# Patient Record
Sex: MALE | Race: WHITE | NOT HISPANIC OR LATINO | Employment: FULL TIME | URBAN - METROPOLITAN AREA
[De-identification: names, ages, dates, MRNs, and addresses within clinical notes are randomized per-mention and may not be internally consistent; named-entity substitution may affect disease eponyms.]

---

## 2018-04-02 ENCOUNTER — HOSPITAL ENCOUNTER (EMERGENCY)
Facility: CLINIC | Age: 23
Discharge: HOME | End: 2018-04-02
Attending: EMERGENCY MEDICINE | Admitting: EMERGENCY MEDICINE
Payer: OTHER GOVERNMENT

## 2018-04-02 VITALS
SYSTOLIC BLOOD PRESSURE: 140 MMHG | RESPIRATION RATE: 16 BRPM | WEIGHT: 250 LBS | DIASTOLIC BLOOD PRESSURE: 98 MMHG | TEMPERATURE: 97.9 F | BODY MASS INDEX: 33.67 KG/M2 | OXYGEN SATURATION: 97 %

## 2018-04-02 DIAGNOSIS — L03.116 CELLULITIS OF LEFT LEG: ICD-10-CM

## 2018-04-02 PROCEDURE — 76882 US LMTD JT/FCL EVL NVASC XTR: CPT | Mod: 26 | Performed by: EMERGENCY MEDICINE

## 2018-04-02 PROCEDURE — 99284 EMERGENCY DEPT VISIT MOD MDM: CPT | Mod: 25 | Performed by: EMERGENCY MEDICINE

## 2018-04-02 PROCEDURE — 25000132 ZZH RX MED GY IP 250 OP 250 PS 637: Performed by: EMERGENCY MEDICINE

## 2018-04-02 PROCEDURE — 76882 US LMTD JT/FCL EVL NVASC XTR: CPT | Performed by: EMERGENCY MEDICINE

## 2018-04-02 RX ORDER — SULFAMETHOXAZOLE/TRIMETHOPRIM 800-160 MG
1 TABLET ORAL ONCE
Status: COMPLETED | OUTPATIENT
Start: 2018-04-02 | End: 2018-04-02

## 2018-04-02 RX ORDER — CEPHALEXIN 500 MG/1
500 CAPSULE ORAL ONCE
Status: COMPLETED | OUTPATIENT
Start: 2018-04-02 | End: 2018-04-02

## 2018-04-02 RX ORDER — FLUTICASONE PROPIONATE 50 MCG
2 SPRAY, SUSPENSION (ML) NASAL
COMMUNITY
Start: 2018-03-29

## 2018-04-02 RX ORDER — IBUPROFEN 200 MG
800 TABLET ORAL EVERY 8 HOURS PRN
Qty: 30 TABLET | Refills: 0 | COMMUNITY
Start: 2018-04-02

## 2018-04-02 RX ORDER — GLYCOPYRROLATE 1 MG/1
1 TABLET ORAL
COMMUNITY
Start: 2018-03-29

## 2018-04-02 RX ORDER — CEPHALEXIN 500 MG/1
500 CAPSULE ORAL 4 TIMES DAILY
Qty: 40 CAPSULE | Refills: 0 | Status: SHIPPED | OUTPATIENT
Start: 2018-04-02 | End: 2018-04-12

## 2018-04-02 RX ORDER — IBUPROFEN 400 MG/1
800 TABLET, FILM COATED ORAL ONCE
Status: COMPLETED | OUTPATIENT
Start: 2018-04-02 | End: 2018-04-02

## 2018-04-02 RX ORDER — SULFAMETHOXAZOLE/TRIMETHOPRIM 800-160 MG
1 TABLET ORAL 2 TIMES DAILY
Qty: 20 TABLET | Refills: 0 | Status: SHIPPED | OUTPATIENT
Start: 2018-04-02 | End: 2018-04-12

## 2018-04-02 RX ORDER — ACETAMINOPHEN 500 MG
1000 TABLET ORAL EVERY 8 HOURS PRN
Qty: 30 TABLET | Refills: 0 | COMMUNITY
Start: 2018-04-02

## 2018-04-02 RX ADMIN — CEPHALEXIN 500 MG: 500 CAPSULE ORAL at 04:50

## 2018-04-02 RX ADMIN — SULFAMETHOXAZOLE AND TRIMETHOPRIM 1 TABLET: 800; 160 TABLET ORAL at 04:50

## 2018-04-02 RX ADMIN — IBUPROFEN 800 MG: 400 TABLET ORAL at 04:49

## 2018-04-02 ASSESSMENT — ENCOUNTER SYMPTOMS
ABDOMINAL PAIN: 0
CHEST TIGHTNESS: 0
CHILLS: 0
APPETITE CHANGE: 0
FEVER: 0
LIGHT-HEADEDNESS: 0
HEADACHES: 0
FATIGUE: 0
SHORTNESS OF BREATH: 0

## 2018-04-02 NOTE — ED AVS SNAPSHOT
[facility] Emergency Department    [address] [facility] [address]    Phone:  [phone]    Fax:  [phone]                                       Charles Perales   MRN: [id number]    Department:  [facility] Emergency Department   Date of Visit:  4/2/2018           Patient Information     Date Of Birth          1995        Your diagnoses for this visit were:     Cellulitis of left leg        You were seen by Oh Polo MD.      Follow-up Information     Follow up with [facility]. Schedule an appointment as soon as possible for a visit in 5 days.    Specialty:  Family Practice    Why:  For follow up of your leg cellulitis    Contact information:    [address] [place] [id number] [phone]    Additional information:    The medical center is located at   [address] [place] [address] (between [facility] and   [address], [place] miles north   of [place]).        Go to [facility] Emergency Department.    Specialty:  EMERGENCY MEDICINE    Why:  As needed, If symptoms worsen    Contact information:    [facility] [id number] [phone]    Additional information:    The medical center is located at   [address] [place] [address] (between [facility] and   [address], [place] north   of [place]).        Discharge Instructions       Alternate acetaminophen with ibuprofen every 4 hours as needed for pain (example: acetaminophen at 8am, ibuprofen at 12pm, acetaminophen at 4pm, ibuprofen at 8pm, etc).  See discharge papers or medication label for dose    Discharge Instructions for Cellulitis  You have been diagnosed with cellulitis. This is an infection in the deepest layer of the skin. In some cases, the infection also affects the muscle. Cellulitis is caused by bacteria. The bacteria can enter the body through broken skin. This can happen with a cut, scratch, animal bite, or an insect bite that has been scratched.  You may have been treated in the hospital with antibiotics and fluids. You will likely be given a prescription for antibiotics to take at home. This sheet will help you take care of yourself at home.  Home care  When you are home:    Take the prescribed antibiotic medicine you are given as directed until it is gone. Take it even if you feel better. It treats the infection and stops it from returning. Not taking all the medicine can make future infections hard to treat.    Keep the infected area clean.    When possible, raise the infected area above the level of your heart. This helps keep swelling down.    Talk with your healthcare provider if you are in pain. Ask what kind of over-the-counter medicine you can take for pain.    Apply clean bandages as advised.    Take your temperature once a day for a week.    Wash your hands often to prevent spreading the infection.  In the future, wash your hands before and after you touch cuts, scratches, or bandages. This will help prevent infection.   When to call your healthcare provider  Call your healthcare provider immediately if you have any of the following:    Difficulty or pain when moving the joints above or below the infected area    Discharge or pus draining from the area    Fever of 100.4 F (38 C) or higher, or as directed by your healthcare provider    Pain that gets worse in or around the infected     Redness that gets worse in or around the infected area, particularly if the area of redness expands to a wider area    Shaking chills    Swelling of the infected area    Vomiting   Date Last Reviewed: 8/1/2016 2000-2017 The [employer]. [address] [place] [address]. All rights reserved. This information is not intended as a substitute for professional medical care. Always follow your healthcare professional's instructions.          24 Hour Appointment Hotline       To make an appointment at any [facility], call [phone]  [phone] If you don't have a family doctor or clinic, we will help you find one. [facility] clinics are conveniently located to serve the needs of you and your family.             Review of your medicines      START taking        Dose / Directions Last dose taken    acetaminophen 500 MG tablet   Commonly known as:  TYLENOL   Dose:  1000 mg   Quantity:  30 tablet        Take 2 tablets (1,000 mg) by mouth every 8 hours as needed for mild pain   Refills:  0        cephALEXin 500 MG capsule   Commonly known as:  KEFLEX   Dose:  500 mg   Quantity:  40 capsule        Take 1 capsule (500 mg) by mouth 4 times daily for 10 days   Refills:  0        ibuprofen 200 MG tablet   Commonly known as:  ADVIL/MOTRIN   Dose:  800 mg   Quantity:  30 tablet        Take 4 tablets (800 mg) by mouth every 8 hours as needed for mild pain   Refills:  0        sulfamethoxazole-trimethoprim 800-160 MG per tablet   Commonly known as:  BACTRIM DS   Dose:  1 tablet   Quantity:  20 tablet        Take 1 tablet by mouth 2 times daily for 10 days   Refills:  0          Our records show that you are taking the medicines listed below. If these are incorrect, please call your family doctor or clinic.        Dose / Directions Last dose taken    aluminum chloride 20 % external solution   Commonly known as:  DRYSOL   Quantity:  60 mL        Apply topically At Bedtime To improve effect, cover area of application with plastic wrap,  hold in place with golves, and wash area in morning. As sweating improves, decrease use to 1-2 times weekly.   Refills:  0        BD insulin syringe ULTRAFINE 30G X 1/2\" 0.5 ML   Dose:  1 Syringe   Quantity:  100 each   Generic drug:  insulin syringe-needle U-100        1 Syringe 4 times daily.   Refills:  prn        blood glucose monitoring lancets   Quantity:  100 each        by Lancet route. 2   Refills:  12        Cholecalciferol 97245 UNITS Tabs        Refills:  0        fluticasone 50 MCG/ACT spray   Commonly known " as:  FLONASE   Dose:  2 spray        2 sprays   Refills:  0        * FREESTYLE LITE test strip   Quantity:  100 strip   Generic drug:  blood glucose monitoring        by Strip route 4 times daily (before meals and nightly). 4   Refills:  prn        * FREESTYLE LITE test strip   Quantity:  100 strip   Generic drug:  blood glucose monitoring        Use to test blood sugars 6 daily or as directed.   Refills:  12        GLUCAGON EMERGENCY 1 MG kit   Dose:  1 mg   Generic drug:  glucagon        Inject 1 mg Subcutaneous   Refills:  0        glycopyrrolate 1 MG tablet   Commonly known as:  ROBINUL   Dose:  1 mg        Take 1 mg by mouth   Refills:  0        insulin aspart 100 UNIT/ML injection   Commonly known as:  NovoLOG PEN   Quantity:  3 Month        Sliding scale before meals  150-200 2 units  201-250 4 units  251-300 6 units  301-350 8 units  351-400 10 units  >400 12 units   ( Patient uses up to 150 units per day)   Refills:  0        insulin glargine 100 UNIT/ML injection   Commonly known as:  LANTUS SOLOSTAR   Dose:  24 Units   Quantity:  3 Month        Inject 24 Units Subcutaneous At Bedtime   Refills:  12        ramipril 1.25 MG capsule   Commonly known as:  ALTACE   Dose:  1.25 mg   Quantity:  90 capsule        Take 1 capsule (1.25 mg) by mouth daily   Refills:  0        * Notice:  This list has 2 medication(s) that are the same as other medications prescribed for you. Read the directions carefully, and ask your doctor or other care provider to review them with you.            Prescriptions were sent or printed at these locations (4 Prescriptions)                   [facility] [address] [address]    Telephone:  [phone]   Fax:  [phone]   Hours:                  E-Prescribed (2 of 4)         cephALEXin (KEFLEX) 500 MG capsule               sulfamethoxazole-trimethoprim (BACTRIM DS) 800-160 MG per tablet                 Not Printed or  Sent (2 of 4)         ibuprofen (ADVIL/MOTRIN) 200 MG tablet               acetaminophen (TYLENOL) 500 MG tablet                Procedures and tests performed during your visit     POC US SOFT TISSUE      Orders Needing Specimen Collection     None      Pending Results     No orders found from 3/31/2018 to 4/3/2018.            Pending Culture Results     No orders found from 3/31/2018 to 4/3/2018.            Pending Results Instructions     If you had any lab results that were not finalized at the time of your Discharge, you can call the ED Lab Result RN at [phone]. You will be contacted by this team for any positive Lab results or changes in treatment. The nurses are available 7 days a week from 10A to 6:30P.  You can leave a message 24 hours per day and they will return your call.        Test Results From Your Hospital Stay        4/2/2018  4:48 AM      [facility] Procedure Note     Limited Bedside ED Ultrasound of Soft Tissue:    PROCEDURE: PERFORMED BY: Dr. Oh Mishra Ogema  INDICATIONS/SYMPTOM: Skin redness, evaluate for abscess, cellulitis or foreign body  PROBE: High frequency linear probe  BODY LOCATION: Soft tissue located on extremity     FINDINGS: Cobblestoning of soft tissue: present   Hypoechoic fluid (ie abscess) identified: a small linear hypoechoic channel present approximately 0.3 cm x 2 cm      INTERPRETATION:  The soft tissue and muscle layers were evaluated.      Findings indicate cellulitis with possible small linear abscess.    IMAGE DOCUMENTATION: Images were archived to PACs system.                    Thank you for choosing [facility]       Thank you for choosing [facility] for your care. Our goal is always to provide you with excellent care. Hearing back from our patients is one way we can continue to improve our services. Please take a few minutes to complete the written survey that you may receive in the mail after you visit with us. Thank you!        [facility]  Information     [employer] gives you secure access to your electronic health record. If you see a primary care provider, you can also send messages to your care team and make appointments. If you have questions, please call your primary care clinic.  If you do not have a primary care provider, please call [phone] and they will assist you.        Care EveryWhere ID     This is your Care EveryWhere ID. This could be used by other organizations to access your [facility] medical records  [id number]        Equal Access to Services     BENOIT SEWELL : Hadii fabrizio jeano Somedardo, waaxda luqadaha, qaybta kaalmada adekristy, ben antunez. So [facility] [phone].    ATENCIÓN: Si habla español, tiene a rock disposición servicios gratuitos de asistencia lingüística. Llame al [phone].    We comply with applicable federal civil rights laws and [place] laws. We do not discriminate on the basis of race, color, national origin, age, disability, sex, sexual orientation, or gender identity.            After Visit Summary       This is your record. Keep this with you and show to your community pharmacist(s) and doctor(s) at your next visit.

## 2018-04-02 NOTE — ED PROVIDER NOTES
"  History     Chief Complaint   Patient presents with     Wound Check     \"cyst\" inner left thigh X 3-4 days     HPI  Charles Perales is a 22 year old male with history of frequent skin infections presents for evaluation of progressive worsening soreness in his left inner thigh over the past 3-4 days.  Patient reports it began to drain some yesterday with a fair amount of purulent material and slight bleeding.  Tonight continues to be sore draining has decreased.  Denies fever or chills.  Has not taken anything for pain since yesterday morning.    Problem List:    Patient Active Problem List    Diagnosis Date Noted     Ketonuria 04/13/2012     Priority: High     Hyperlipidemia LDL goal <100 02/17/2013     Priority: Medium     Intermittent asthma 04/20/2012     Priority: Medium     Health Care Home 04/17/2012     Priority: Medium     EMERGENCY CARE PLAN  August 6, 2013: No current Care Coordination follow up planned. Please refer if Care Coordination services are needed.    Presenting Problem Signs and Symptoms Treatment Plan   Questions or concerns   during clinic hours   I will call my clinic directly:  [address] [address]  [phone].   Questions or concerns outside clinic hours   I will call the 24 hour nurse line at   [phone] or [facility].   Need to schedule an appointment   I will call the 24 hour scheduling team at [phone] or my clinic directly at [phone].    Same day treatment     I will call my clinic first, nurse line if after hours, urgent care and express care if needed.   Clinic care coordination services (regular clinic hours)     I will call a clinic care coordinator directly:     Nixon Kramer RN  Mon, Tues, Fri - [phone]  Wed, Thurs - [phone]    Dian Diamond :    [phone]    Or call my clinic at [phone] and ask to speak with care coordination.   Crisis Services: Behavioral or Mental Health  Crisis Connection 24 " "Hour Phone Line  [phone]    [facility] 24 Hour Crisis Services  [phone]    [facility]al [facility] Providers[facility] [phone]    [facility]   [phone]       Emergency treatment -- Immediately    [facility] 911            CARDIOVASCULAR SCREENING; LDL GOAL LESS THAN 160 2012     Priority: Medium     Dehydration 2012     Priority: Medium     Type 1 diabetes mellitus with hyperglycemia (H) 2012     Priority: Low        Past Medical History:    Past Medical History:   Diagnosis Date     Asthma      Diabetes mellitus        Past Surgical History:    Past Surgical History:   Procedure Laterality Date     ADENOIDECTOMY  as child     PE TUBES  as child       Family History:    Family History   Problem Relation Age of Onset     Thyroid Disease Mother      hypo     CANCER Father 30      brain cancer       Social History:  Marital Status:  Single [1]  Social History   Substance Use Topics     Smoking status: Former Smoker     Quit date: 2012     Smokeless tobacco: Never Used     Alcohol use No        Medications:      Cholecalciferol 51389 UNITS TABS   fluticasone (FLONASE) 50 MCG/ACT spray   glucagon (GLUCAGON EMERGENCY) 1 MG kit   glycopyrrolate (ROBINUL) 1 MG tablet   cephALEXin (KEFLEX) 500 MG capsule   sulfamethoxazole-trimethoprim (BACTRIM DS) 800-160 MG per tablet   ibuprofen (ADVIL/MOTRIN) 200 MG tablet   acetaminophen (TYLENOL) 500 MG tablet   insulin aspart (NOVOLOG PEN) 100 UNIT/ML soln   insulin glargine (LANTUS SOLOSTAR) 100 UNIT/ML soln   ramipril (ALTACE) 1.25 MG capsule   aluminum chloride (DRYSOL) 20 % external solution   Glucose Blood (FREESTYLE LITE) strip   Insulin Syringe-Needle U-100 (BD INSULIN SYRINGE ULTRAFINE) 30G X 1/2\" 0.5 ML MISC   Glucose Blood (FREESTYLE LITE) strip   FREESTYLE LANCETS MISC         Review of Systems   Constitutional: Negative for appetite change, chills, fatigue and fever.   HENT: Negative for congestion.  "   Respiratory: Negative for chest tightness and shortness of breath.    Gastrointestinal: Negative for abdominal pain.   Skin:        Lesion on left inner thigh   Neurological: Negative for light-headedness and headaches.   All other systems reviewed and are negative.      Physical Exam   BP: (!) 140/98  Heart Rate: 115  Temp: 97.9  F (36.6  C)  Resp: 16  Weight: 113.4 kg (250 lb)  SpO2: 98 %      Physical Exam   Constitutional: He is oriented to person, place, and time. He appears well-developed and well-nourished. No distress.   HENT:   Head: Normocephalic and atraumatic.   Eyes: Conjunctivae are normal.   Cardiovascular:   Initially with borderline tachycardia, improved after exam and treatment   Pulmonary/Chest: Effort normal.   Abdominal: Soft. There is no tenderness.   Musculoskeletal: Normal range of motion.        Left upper leg: He exhibits tenderness.        Legs:  Neurological: He is alert and oriented to person, place, and time.   Skin: Skin is warm and dry.   Psychiatric: He has a normal mood and affect.   Nursing note and vitals reviewed.      ED Course     ED Course     Procedures           Results for orders placed or performed during the hospital encounter of 04/02/18 (from the past 24 hour(s))   POC US SOFT TISSUE    Impression    [facility] Procedure Note     Limited Bedside ED Ultrasound of Soft Tissue:    PROCEDURE: PERFORMED BY: Dr. Oh Polo  INDICATIONS/SYMPTOM: Skin redness, evaluate for abscess, cellulitis or foreign body  PROBE: High frequency linear probe  BODY LOCATION: Soft tissue located on extremity     FINDINGS: Cobblestoning of soft tissue: present   Hypoechoic fluid (ie abscess) identified: a small linear hypoechoic channel present approximately 0.3 cm x 2 cm      INTERPRETATION:  The soft tissue and muscle layers were evaluated.      Findings indicate cellulitis with possible small linear abscess.    IMAGE DOCUMENTATION: Images were archived to PACs system.            Medications   ibuprofen (ADVIL/MOTRIN) tablet 800 mg (800 mg Oral Given 4/2/18 0883)   cephALEXin (KEFLEX) capsule 500 mg (500 mg Oral Given 4/2/18 1520)   sulfamethoxazole-trimethoprim (BACTRIM DS/SEPTRA DS) 800-160 MG per tablet 1 tablet (1 tablet Oral Given 4/2/18 7270)       Assessments & Plan (with Medical Decision Making)  22-year-old male with history of previous skin lesions presenting for evaluation of soreness in his left inner thigh.  Symptoms progressive over the last several days.  No fevers or chills.  Physical exam shows an indurated tender lesion with some active draining.  Ultrasound at bedside showed some thin pocket of residual fluid collection but no discrete fluid pocket amenable to easy drainage.  Discussed risk and benefits of draining the residual small amount of fluid and patient declined.  I think this is reasonable given the small amount of residual fluid.  Started on Bactrim and Keflex for cellulitis with a recommendation to follow-up with primary care in a few days for wound recheck or to return to the ED if symptoms worsen.     I have reviewed the nursing notes.    I have reviewed the findings, diagnosis, plan and need for follow up with the patient.       New Prescriptions    ACETAMINOPHEN (TYLENOL) 500 MG TABLET    Take 2 tablets (1,000 mg) by mouth every 8 hours as needed for mild pain    CEPHALEXIN (KEFLEX) 500 MG CAPSULE    Take 1 capsule (500 mg) by mouth 4 times daily for 10 days    IBUPROFEN (ADVIL/MOTRIN) 200 MG TABLET    Take 4 tablets (800 mg) by mouth every 8 hours as needed for mild pain    SULFAMETHOXAZOLE-TRIMETHOPRIM (BACTRIM DS) 800-160 MG PER TABLET    Take 1 tablet by mouth 2 times daily for 10 days       Final diagnoses:   Cellulitis of left leg       4/2/2018   [facility] EMERGENCY DEPARTMENT     Polo, Oh Mishra MD  04/02/18 2743

## 2018-04-02 NOTE — ED AVS SNAPSHOT
[facility] Emergency Department    [address] [facility] [address]    Phone:  [phone]    Fax:  [phone]                                       Charles Perales   MRN: [id number]    Department:  [facility] Emergency Department   Date of Visit:  4/2/2018           After Visit Summary Signature Page     I have received my discharge instructions, and my questions have been answered. I have discussed any challenges I see with this plan with the nurse or doctor.    ..........................................................................................................................................  Patient/Patient Representative Signature      ..........................................................................................................................................  Patient Representative Print Name and Relationship to Patient    ..................................................               ................................................  Date                                            Time    ..........................................................................................................................................  Reviewed by Signature/Title    ...................................................              ..............................................  Date                                                            Time

## 2018-04-02 NOTE — DISCHARGE INSTRUCTIONS
Alternate acetaminophen with ibuprofen every 4 hours as needed for pain (example: acetaminophen at 8am, ibuprofen at 12pm, acetaminophen at 4pm, ibuprofen at 8pm, etc).  See discharge papers or medication label for dose    Discharge Instructions for Cellulitis  You have been diagnosed with cellulitis. This is an infection in the deepest layer of the skin. In some cases, the infection also affects the muscle. Cellulitis is caused by bacteria. The bacteria can enter the body through broken skin. This can happen with a cut, scratch, animal bite, or an insect bite that has been scratched. You may have been treated in the hospital with antibiotics and fluids. You will likely be given a prescription for antibiotics to take at home. This sheet will help you take care of yourself at home.  Home care  When you are home:    Take the prescribed antibiotic medicine you are given as directed until it is gone. Take it even if you feel better. It treats the infection and stops it from returning. Not taking all the medicine can make future infections hard to treat.    Keep the infected area clean.    When possible, raise the infected area above the level of your heart. This helps keep swelling down.    Talk with your healthcare provider if you are in pain. Ask what kind of over-the-counter medicine you can take for pain.    Apply clean bandages as advised.    Take your temperature once a day for a week.    Wash your hands often to prevent spreading the infection.  In the future, wash your hands before and after you touch cuts, scratches, or bandages. This will help prevent infection.   When to call your healthcare provider  Call your healthcare provider immediately if you have any of the following:    Difficulty or pain when moving the joints above or below the infected area    Discharge or pus draining from the area    Fever of 100.4 F (38 C) or higher, or as directed by your healthcare provider    Pain that gets worse in or  around the infected     Redness that gets worse in or around the infected area, particularly if the area of redness expands to a wider area    Shaking chills    Swelling of the infected area    Vomiting   Date Last Reviewed: 8/1/2016 2000-2017 [employer]. [address] [place] [address]. All rights reserved. This information is not intended as a substitute for professional medical care. Always follow your healthcare professional's instructions.

## 2020-03-02 ENCOUNTER — HEALTH MAINTENANCE LETTER (OUTPATIENT)
Age: 25
End: 2020-03-02

## 2020-12-14 ENCOUNTER — HEALTH MAINTENANCE LETTER (OUTPATIENT)
Age: 25
End: 2020-12-14

## 2021-04-18 ENCOUNTER — HEALTH MAINTENANCE LETTER (OUTPATIENT)
Age: 26
End: 2021-04-18

## 2021-08-07 ENCOUNTER — HEALTH MAINTENANCE LETTER (OUTPATIENT)
Age: 26
End: 2021-08-07

## 2021-10-02 ENCOUNTER — HEALTH MAINTENANCE LETTER (OUTPATIENT)
Age: 26
End: 2021-10-02

## 2022-03-19 ENCOUNTER — HEALTH MAINTENANCE LETTER (OUTPATIENT)
Age: 27
End: 2022-03-19

## 2022-05-14 ENCOUNTER — HEALTH MAINTENANCE LETTER (OUTPATIENT)
Age: 27
End: 2022-05-14

## 2022-09-03 ENCOUNTER — HEALTH MAINTENANCE LETTER (OUTPATIENT)
Age: 27
End: 2022-09-03

## 2023-01-15 ENCOUNTER — HEALTH MAINTENANCE LETTER (OUTPATIENT)
Age: 28
End: 2023-01-15

## 2023-06-03 ENCOUNTER — HEALTH MAINTENANCE LETTER (OUTPATIENT)
Age: 28
End: 2023-06-03

## 2023-07-22 ENCOUNTER — HEALTH MAINTENANCE LETTER (OUTPATIENT)
Age: 28
End: 2023-07-22

## 2024-02-17 ENCOUNTER — HEALTH MAINTENANCE LETTER (OUTPATIENT)
Age: 29
End: 2024-02-17

## 2024-04-21 ENCOUNTER — HOSPITAL ENCOUNTER (EMERGENCY)
Facility: CLINIC | Age: 29
Discharge: HOME | End: 2024-04-21
Payer: COMMERCIAL

## 2024-04-21 VITALS
HEART RATE: 98 BPM | OXYGEN SATURATION: 98 % | TEMPERATURE: 99 F | DIASTOLIC BLOOD PRESSURE: 83 MMHG | RESPIRATION RATE: 18 BRPM | SYSTOLIC BLOOD PRESSURE: 147 MMHG

## 2024-04-21 DIAGNOSIS — H60.92 OTITIS EXTERNA, LEFT: ICD-10-CM

## 2024-04-21 DIAGNOSIS — H66.90 OTITIS MEDIA: ICD-10-CM

## 2024-04-21 PROCEDURE — 99203 OFFICE O/P NEW LOW 30 MIN: CPT

## 2024-04-21 PROCEDURE — G0463 HOSPITAL OUTPT CLINIC VISIT: HCPCS

## 2024-04-21 RX ORDER — CIPROFLOXACIN AND DEXAMETHASONE 3; 1 MG/ML; MG/ML
4 SUSPENSION/ DROPS AURICULAR (OTIC) 2 TIMES DAILY
Qty: 7.5 ML | Refills: 0 | Status: SHIPPED | OUTPATIENT
Start: 2024-04-21 | End: 2024-04-28

## 2024-04-21 ASSESSMENT — ACTIVITIES OF DAILY LIVING (ADL)
ADLS_ACUITY_SCORE: 33
ADLS_ACUITY_SCORE: 33

## 2024-04-21 ASSESSMENT — COLUMBIA-SUICIDE SEVERITY RATING SCALE - C-SSRS
6. HAVE YOU EVER DONE ANYTHING, STARTED TO DO ANYTHING, OR PREPARED TO DO ANYTHING TO END YOUR LIFE?: NO
2. HAVE YOU ACTUALLY HAD ANY THOUGHTS OF KILLING YOURSELF IN THE PAST MONTH?: NO
1. IN THE PAST MONTH, HAVE YOU WISHED YOU WERE DEAD OR WISHED YOU COULD GO TO SLEEP AND NOT WAKE UP?: NO

## 2024-04-21 NOTE — ED PROVIDER NOTES
History     Chief Complaint   Patient presents with    Ear Fullness    Cough     HPI  Charles Perales is a 28 year old male who presents for evaluation of bilateral ear pain and fullness that began about 5 days ago.  Also experiencing cough and congestion that have been ongoing for the past 10 days.  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  Reports there is drainage from the left ear.  Has been trying DayQuil and ibuprofen for symptom relief.  Denies fever, chills, shortness of breath or difficulty with breathing, chest pain or tightness, sore throat or pain with swallowing, abdominal pain, nausea, vomiting, or diarrhea.     Allergies:  No Known Allergies    Problem List:    Patient Active Problem List    Diagnosis Date Noted    Ketonuria 04/13/2012     Priority: High    Hyperlipidemia LDL goal <100 02/17/2013     Priority: Medium    Intermittent asthma 04/20/2012     Priority: Medium    Health Care Home 04/17/2012     Priority: Medium     EMERGENCY CARE PLAN  August 6, 2013: No current Care Coordination follow up planned. Please refer if Care Coordination services are needed.    Presenting Problem Signs and Symptoms Treatment Plan   Questions or concerns   during clinic hours   I will call my clinic directly:  [place] [address]  [phone].   Questions or concerns outside clinic hours   I will call the 24 hour nurse line at   [phone] or [address] [place] [address].   Need to schedule an appointment   I will call the 24 hour scheduling team at [phone] or my clinic directly at [phone].    Same day treatment     I will call my clinic first, nurse line if after hours, urgent care and express care if needed.     Clinic care coordination services (regular clinic hours)     I will call a clinic care coordinator directly:     Nixon Kramer RN  Mon, Tues, Fri - [phone]  Wed, Thurs - [phone]    Dian Diamond :    [phone]    Or call my  clinic at [phone] and ask to speak with care coordination.     Crisis Services: Behavioral or Mental Health  [facility] 24 Hour Phone Line  [phone]    [facility] [place] 24 Hour Crisis Services  [phone]    [facility] [phone]    [facility]   [phone]         Emergency treatment -- Immediately    [facility] 911           CARDIOVASCULAR SCREENING; LDL GOAL LESS THAN 160 2012     Priority: Medium    Dehydration 2012     Priority: Medium    Type 1 diabetes mellitus with hyperglycemia (H) 2012     Priority: Low        Past Medical History:    Past Medical History:   Diagnosis Date    Asthma     Diabetes mellitus        Past Surgical History:    Past Surgical History:   Procedure Laterality Date    ADENOIDECTOMY  as child    PE TUBES  as child       Family History:    Family History   Problem Relation Age of Onset    Thyroid Disease Mother         hypo    Cancer Father 30         brain cancer       Social History:  Marital Status:  Single [1]  Social History     Tobacco Use    Smoking status: Former     Current packs/day: 0.00     Types: Cigarettes     Quit date: 2012     Years since quittin.6    Smokeless tobacco: Never   Substance Use Topics    Alcohol use: No    Drug use: No        Medications:    amoxicillin-clavulanate (AUGMENTIN) 875-125 MG tablet  ciprofloxacin-dexAMETHasone (CIPRODEX) 0.3-0.1 % otic suspension  acetaminophen (TYLENOL) 500 MG tablet  aluminum chloride (DRYSOL) 20 % external solution  Cholecalciferol 68382 UNITS TABS  fluticasone (FLONASE) 50 MCG/ACT spray  FREESTYLE LANCETS MISC  glucagon (GLUCAGON EMERGENCY) 1 MG kit  Glucose Blood (FREESTYLE LITE) strip  Glucose Blood (FREESTYLE LITE) strip  glycopyrrolate (ROBINUL) 1 MG tablet  ibuprofen (ADVIL/MOTRIN) 200 MG tablet  insulin aspart (NOVOLOG PEN) 100 UNIT/ML soln  insulin glargine (LANTUS SOLOSTAR) 100 UNIT/ML soln  Insulin Syringe-Needle U-100  "(BD INSULIN SYRINGE ULTRAFINE) 30G X 1/2\" 0.5 ML MISC  ramipril (ALTACE) 1.25 MG capsule          Review of Systems  Pertinent review of systems as documented per HPI above.    Physical Exam   BP: (!) 147/83  Pulse: 98  Temp: 99  F (37.2  C)  Resp: 18  SpO2: 98 %      Physical Exam  Vitals and nursing note reviewed.   Constitutional:       General: He is not in acute distress.     Appearance: Normal appearance. He is not ill-appearing, toxic-appearing or diaphoretic.   HENT:      Head: Normocephalic and atraumatic.      Right Ear: Tympanic membrane is injected and erythematous.      Left Ear: Drainage present. No swelling or tenderness.      Ears:      Comments: L TM not visualized due to drainage     Nose: Congestion present.      Mouth/Throat:      Mouth: Mucous membranes are moist.      Pharynx: Oropharynx is clear. No oropharyngeal exudate or posterior oropharyngeal erythema.   Cardiovascular:      Rate and Rhythm: Normal rate.   Pulmonary:      Effort: Pulmonary effort is normal.      Breath sounds: Normal breath sounds.   Skin:     General: Skin is warm and dry.   Neurological:      General: No focal deficit present.      Mental Status: He is alert and oriented to person, place, and time.   Psychiatric:         Mood and Affect: Mood normal.         Behavior: Behavior normal.           Assessments & Plan (with Medical Decision Making)     28-year-old male who presents for evaluation of bilateral ear pain and fullness that began 5 days ago.  Has been experiencing cough and congestion ongoing for the past 10 days.  Reports he noticed drainage from the left ear.  Has been trying DayQuil and ibuprofen for symptom relief.    On exam, he is well-appearing, afebrile with vital signs within normal limits.  Right TM is injected, and erythematous.  Left canal has significant drainage, TM not visualized due to this.  Rest of exam as above.  Concern for otitis externa of left ear along with otitis media infection of right " ear.  Discussed findings with patient and recommended treatment with Augmentin and Ciprodex drops for infections.  Discussed usage and potential adverse effects of medications.  Advised to finish entire course of antibiotics for adequate treatment.  Discussed supportive cares to aid with symptom relief.    Advised that if symptoms do not improve despite the recommended treatment, or if patient develops any new or worsening symptoms, that they return for further evaluation.  All questions answered.  Patient verbalizes understanding and agreement with the above plan.    I have reviewed the nursing notes.    I have reviewed the findings, diagnosis, plan and need for follow up with the patient.      Discharge Medication List as of 4/21/2024  1:10 PM        START taking these medications    Details   amoxicillin-clavulanate (AUGMENTIN) 875-125 MG tablet Take 1 tablet by mouth 2 times daily for 7 days, Disp-14 tablet, R-0, E-Prescribe      ciprofloxacin-dexAMETHasone (CIPRODEX) 0.3-0.1 % otic suspension Place 4 drops Into the left ear 2 times daily for 7 days, Disp-7.5 mL, R-0, E-Prescribe             Final diagnoses:   Otitis externa, left   Otitis media       4/21/2024   [facility] EMERGENCY DEPT       Leticia Liz PA-C  04/22/24 8220

## 2024-04-21 NOTE — DISCHARGE INSTRUCTIONS
You were seen today for pain and fullness of your ears.  I noticed an outer ear infection in your left ear along with a middle ear infection in your right ear.  For the left ear I sent antibiotic drops, you will put 4 drops into that ear twice a day for 1 week.  For the right ear I sent a prescription for Augmentin, you will take 1 pill twice a day for 1 week.  I recommend you follow-up with primary care for a recheck of your ears after you finish your treatment.

## 2024-07-06 ENCOUNTER — HEALTH MAINTENANCE LETTER (OUTPATIENT)
Age: 29
End: 2024-07-06

## 2024-09-14 ENCOUNTER — HEALTH MAINTENANCE LETTER (OUTPATIENT)
Age: 29
End: 2024-09-14

## 2025-03-30 ENCOUNTER — HEALTH MAINTENANCE LETTER (OUTPATIENT)
Age: 30
End: 2025-03-30

## 2025-07-13 ENCOUNTER — HEALTH MAINTENANCE LETTER (OUTPATIENT)
Age: 30
End: 2025-07-13